# Patient Record
Sex: FEMALE | Race: WHITE | NOT HISPANIC OR LATINO | URBAN - METROPOLITAN AREA
[De-identification: names, ages, dates, MRNs, and addresses within clinical notes are randomized per-mention and may not be internally consistent; named-entity substitution may affect disease eponyms.]

---

## 2017-09-20 ENCOUNTER — OFFICE (OUTPATIENT)
Dept: URBAN - METROPOLITAN AREA CLINIC 101 | Facility: CLINIC | Age: 27
End: 2017-09-20
Payer: MEDICAID

## 2017-09-20 VITALS
DIASTOLIC BLOOD PRESSURE: 73 MMHG | TEMPERATURE: 98.6 F | HEIGHT: 59 IN | SYSTOLIC BLOOD PRESSURE: 116 MMHG | WEIGHT: 103 LBS | HEART RATE: 70 BPM

## 2017-09-20 DIAGNOSIS — R10.11 RIGHT UPPER QUADRANT PAIN: ICD-10-CM

## 2017-09-20 DIAGNOSIS — R19.5 OTHER FECAL ABNORMALITIES: ICD-10-CM

## 2017-09-20 DIAGNOSIS — D50.9 IRON DEFICIENCY ANEMIA, UNSPECIFIED: ICD-10-CM

## 2017-09-20 DIAGNOSIS — R11.0 NAUSEA: ICD-10-CM

## 2017-09-20 PROCEDURE — 99214 OFFICE O/P EST MOD 30 MIN: CPT

## 2017-11-02 ENCOUNTER — ON CAMPUS - OUTPATIENT (OUTPATIENT)
Dept: URBAN - METROPOLITAN AREA HOSPITAL 14 | Facility: HOSPITAL | Age: 27
End: 2017-11-02
Payer: MEDICAID

## 2017-11-02 DIAGNOSIS — K29.60 OTHER GASTRITIS WITHOUT BLEEDING: ICD-10-CM

## 2017-11-02 DIAGNOSIS — R10.11 RIGHT UPPER QUADRANT PAIN: ICD-10-CM

## 2017-11-02 DIAGNOSIS — R11.0 NAUSEA: ICD-10-CM

## 2017-11-02 PROCEDURE — 43239 EGD BIOPSY SINGLE/MULTIPLE: CPT

## 2017-12-08 ENCOUNTER — OFFICE (OUTPATIENT)
Dept: URBAN - METROPOLITAN AREA CLINIC 33 | Facility: CLINIC | Age: 27
End: 2017-12-08
Payer: MEDICAID

## 2017-12-08 VITALS
HEIGHT: 59 IN | WEIGHT: 99 LBS | DIASTOLIC BLOOD PRESSURE: 81 MMHG | TEMPERATURE: 97.9 F | HEART RATE: 68 BPM | SYSTOLIC BLOOD PRESSURE: 119 MMHG

## 2017-12-08 DIAGNOSIS — R10.13 EPIGASTRIC PAIN: ICD-10-CM

## 2017-12-08 DIAGNOSIS — R10.11 RIGHT UPPER QUADRANT PAIN: ICD-10-CM

## 2017-12-08 PROCEDURE — 99213 OFFICE O/P EST LOW 20 MIN: CPT

## 2017-12-08 RX ORDER — OMEPRAZOLE 20 MG/1
20 CAPSULE, DELAYED RELEASE ORAL
Qty: 30 | Refills: 5 | Status: COMPLETED
Start: 2017-12-08 | End: 2018-02-26

## 2018-02-26 ENCOUNTER — OFFICE (OUTPATIENT)
Dept: URBAN - METROPOLITAN AREA CLINIC 78 | Facility: CLINIC | Age: 28
End: 2018-02-26
Payer: MEDICAID

## 2018-02-26 VITALS
TEMPERATURE: 97.9 F | SYSTOLIC BLOOD PRESSURE: 125 MMHG | WEIGHT: 95 LBS | HEIGHT: 59 IN | DIASTOLIC BLOOD PRESSURE: 78 MMHG | HEART RATE: 69 BPM

## 2018-02-26 DIAGNOSIS — K31.84 GASTROPARESIS: ICD-10-CM

## 2018-02-26 DIAGNOSIS — R11.0 NAUSEA: ICD-10-CM

## 2018-02-26 DIAGNOSIS — R63.4 ABNORMAL WEIGHT LOSS: ICD-10-CM

## 2018-02-26 PROCEDURE — 99214 OFFICE O/P EST MOD 30 MIN: CPT

## 2018-03-27 ENCOUNTER — OFFICE (OUTPATIENT)
Dept: URBAN - METROPOLITAN AREA CLINIC 33 | Facility: CLINIC | Age: 28
End: 2018-03-27
Payer: MEDICAID

## 2018-03-27 VITALS
TEMPERATURE: 97.7 F | HEART RATE: 66 BPM | WEIGHT: 97 LBS | SYSTOLIC BLOOD PRESSURE: 103 MMHG | HEIGHT: 59 IN | DIASTOLIC BLOOD PRESSURE: 73 MMHG

## 2018-03-27 DIAGNOSIS — K31.84 GASTROPARESIS: ICD-10-CM

## 2018-03-27 DIAGNOSIS — D50.9 IRON DEFICIENCY ANEMIA, UNSPECIFIED: ICD-10-CM

## 2018-03-27 DIAGNOSIS — R63.4 ABNORMAL WEIGHT LOSS: ICD-10-CM

## 2018-03-27 PROCEDURE — 99214 OFFICE O/P EST MOD 30 MIN: CPT

## 2018-08-03 ENCOUNTER — OFFICE (OUTPATIENT)
Dept: URBAN - METROPOLITAN AREA CLINIC 101 | Facility: CLINIC | Age: 28
End: 2018-08-03
Payer: MEDICAID

## 2018-08-03 VITALS
SYSTOLIC BLOOD PRESSURE: 111 MMHG | DIASTOLIC BLOOD PRESSURE: 77 MMHG | HEART RATE: 73 BPM | WEIGHT: 89 LBS | HEIGHT: 59 IN | TEMPERATURE: 98.8 F

## 2018-08-03 DIAGNOSIS — K31.84 GASTROPARESIS: ICD-10-CM

## 2018-08-03 DIAGNOSIS — R63.4 ABNORMAL WEIGHT LOSS: ICD-10-CM

## 2018-08-03 PROCEDURE — 99213 OFFICE O/P EST LOW 20 MIN: CPT

## 2018-08-03 RX ORDER — DRONABINOL 5 MG/1
10 CAPSULE, LIQUID FILLED ORAL
Qty: 60 | Refills: 2 | Status: COMPLETED
Start: 2018-08-03 | End: 2019-02-14

## 2019-02-14 ENCOUNTER — OFFICE (OUTPATIENT)
Dept: URBAN - METROPOLITAN AREA CLINIC 101 | Facility: CLINIC | Age: 29
End: 2019-02-14
Payer: MEDICAID

## 2019-02-14 VITALS
TEMPERATURE: 98.6 F | HEIGHT: 59 IN | WEIGHT: 98 LBS | HEART RATE: 99 BPM | SYSTOLIC BLOOD PRESSURE: 94 MMHG | DIASTOLIC BLOOD PRESSURE: 75 MMHG

## 2019-02-14 DIAGNOSIS — R19.7 DIARRHEA, UNSPECIFIED: ICD-10-CM

## 2019-02-14 DIAGNOSIS — R11.2 NAUSEA WITH VOMITING, UNSPECIFIED: ICD-10-CM

## 2019-02-14 DIAGNOSIS — K31.84 GASTROPARESIS: ICD-10-CM

## 2019-02-14 PROCEDURE — 99213 OFFICE O/P EST LOW 20 MIN: CPT

## 2019-02-14 RX ORDER — HYOSCYAMINE SULFATE 0.12 MG/1
0.38 TABLET, ORALLY DISINTEGRATING ORAL
Qty: 45 | Refills: 2 | Status: COMPLETED
Start: 2019-02-14 | End: 2019-07-09

## 2019-07-09 ENCOUNTER — OFFICE (OUTPATIENT)
Dept: URBAN - METROPOLITAN AREA CLINIC 101 | Facility: CLINIC | Age: 29
End: 2019-07-09
Payer: MEDICAID

## 2019-07-09 VITALS
HEIGHT: 59 IN | HEART RATE: 83 BPM | DIASTOLIC BLOOD PRESSURE: 72 MMHG | WEIGHT: 99 LBS | SYSTOLIC BLOOD PRESSURE: 112 MMHG | TEMPERATURE: 98.2 F

## 2019-07-09 DIAGNOSIS — R10.13 EPIGASTRIC PAIN: ICD-10-CM

## 2019-07-09 DIAGNOSIS — R53.83 OTHER FATIGUE: ICD-10-CM

## 2019-07-09 PROCEDURE — 99213 OFFICE O/P EST LOW 20 MIN: CPT

## 2020-10-26 ENCOUNTER — TELEHEALTH PROVIDED OTHER THAN IN PATIENT'S HOME (OUTPATIENT)
Dept: URBAN - METROPOLITAN AREA TELEHEALTH 12 | Facility: TELEHEALTH | Age: 30
End: 2020-10-26
Payer: MEDICAID

## 2020-10-26 VITALS — WEIGHT: 98 LBS | HEIGHT: 59 IN

## 2020-10-26 DIAGNOSIS — R11.0 NAUSEA: ICD-10-CM

## 2020-10-26 DIAGNOSIS — R10.13 EPIGASTRIC PAIN: ICD-10-CM

## 2020-10-26 DIAGNOSIS — K31.84 GASTROPARESIS: ICD-10-CM

## 2020-10-26 PROCEDURE — 99213 OFFICE O/P EST LOW 20 MIN: CPT | Mod: 95 | Performed by: INTERNAL MEDICINE

## 2020-10-26 NOTE — SERVICEHPINOTES
PATIENT VERIFIED BY DATE OF BIRTH AND NAME. Patient has been consented for this telecommunication visit.   31 yo fm with continued abdm pain.  Pt states it is in her epigstric/sternal region.  Pt states sx sometimes after eating.  Rare nausea.  Pt states sometimes has pain on the left side of her back - radiates there.  Pt with continued issues.  Pt sx x3 weeks.  Pt has lost weight - has lost about 5-7 lbs.  Pt does have gastroparesis.  ROS o/w negative.

## 2021-01-28 ENCOUNTER — OFFICE (OUTPATIENT)
Dept: URBAN - METROPOLITAN AREA TELEHEALTH 12 | Facility: TELEHEALTH | Age: 31
End: 2021-01-28
Payer: MEDICAID

## 2021-01-28 VITALS — HEIGHT: 59 IN | WEIGHT: 103 LBS

## 2021-01-28 DIAGNOSIS — D50.9 IRON DEFICIENCY ANEMIA, UNSPECIFIED: ICD-10-CM

## 2021-01-28 DIAGNOSIS — R10.13 EPIGASTRIC PAIN: ICD-10-CM

## 2021-01-28 PROCEDURE — 99214 OFFICE O/P EST MOD 30 MIN: CPT

## 2021-01-28 RX ORDER — PANTOPRAZOLE SODIUM 40 MG/1
TABLET, DELAYED RELEASE ORAL
Qty: 30 | Refills: 5 | Status: ACTIVE
Start: 2021-01-28

## 2021-01-28 NOTE — SERVICEHPINOTES
PATIENT VERIFIED BY DATE OF BIRTH AND NAME. Patient has been consented for this telecommunication visit.   ALTON TOVAR   is a   30  female who complains of abd pain. Normal reports epigastric/left sided abdominal pain which radiates to shoulder. Ongoing for awhile, at least since last visit in Oct.  Pain is constant and gnawing-type pain. Normal abd US 11/2020. EGD in 2017 with moderate chronic gastritis. Labs were ordered after last visit in Oct (CBC, CMP, lipase, amylase) which she states she had completed and were normal, will bring copies by office tomorrow. She takes tums prn which do seem to provide short term relief. She is not taking PPI. She reports h/o anemia which ferritin has been decreasing recently, unsure levels. She had physical today, awaiting lab results. A little more fatigued recently. No melena.. Appetite is fine. nausea intermittently, she does have a h/o gastroparesis but has not had issues for awhile, watches what she eats. No vomiting. Gaining weight. Takes Tylenol prn, no nsaids. Nonsmoker. Drinks 1 cup of coffee/day. Has SVT. All 10 point review of systems have been reviewed as per HPI and otherwise negative.

## 2021-02-19 ENCOUNTER — OFFICE (OUTPATIENT)
Dept: URBAN - METROPOLITAN AREA CLINIC 34 | Facility: CLINIC | Age: 31
End: 2021-02-19
Payer: MEDICAID

## 2021-02-19 DIAGNOSIS — Z11.59 ENCOUNTER FOR SCREENING FOR OTHER VIRAL DISEASES: ICD-10-CM

## 2021-02-19 PROCEDURE — 99211 OFF/OP EST MAY X REQ PHY/QHP: CPT | Mod: 25,CS | Performed by: INTERNAL MEDICINE

## 2021-03-15 ENCOUNTER — ON CAMPUS - OUTPATIENT (OUTPATIENT)
Dept: URBAN - METROPOLITAN AREA HOSPITAL 16 | Facility: HOSPITAL | Age: 31
End: 2021-03-15
Payer: MEDICAID

## 2021-03-15 DIAGNOSIS — R10.13 EPIGASTRIC PAIN: ICD-10-CM

## 2021-03-15 DIAGNOSIS — K20.80 OTHER ESOPHAGITIS WITHOUT BLEEDING: ICD-10-CM

## 2021-03-15 DIAGNOSIS — K29.60 OTHER GASTRITIS WITHOUT BLEEDING: ICD-10-CM

## 2021-03-15 DIAGNOSIS — D50.9 IRON DEFICIENCY ANEMIA, UNSPECIFIED: ICD-10-CM

## 2021-03-15 PROCEDURE — 43239 EGD BIOPSY SINGLE/MULTIPLE: CPT | Performed by: INTERNAL MEDICINE

## 2021-04-28 ENCOUNTER — TELEHEALTH PROVIDED OTHER THAN IN PATIENT'S HOME (OUTPATIENT)
Dept: URBAN - METROPOLITAN AREA TELEHEALTH 7 | Facility: TELEHEALTH | Age: 31
End: 2021-04-28
Payer: MEDICAID

## 2021-04-28 VITALS — WEIGHT: 104 LBS | HEIGHT: 59 IN

## 2021-04-28 DIAGNOSIS — R11.0 NAUSEA: ICD-10-CM

## 2021-04-28 DIAGNOSIS — K31.84 GASTROPARESIS: ICD-10-CM

## 2021-04-28 DIAGNOSIS — R10.13 EPIGASTRIC PAIN: ICD-10-CM

## 2021-04-28 PROCEDURE — 99214 OFFICE O/P EST MOD 30 MIN: CPT | Mod: 95 | Performed by: PHYSICIAN ASSISTANT

## 2021-04-28 NOTE — SERVICEHPINOTES
PATIENT VERIFIED BY DATE OF BIRTH AND NAME. Patient has been consented for this telecommunication visit.   Ms. Lozada is here for f/u regarding nausea and epigastric pain. She has nausea after any PO intake. She has a lot of "pressure" in her sternum. She is forcing herself to eat. Not vomiting and has been able to maintain her weight. She underwent an EGD 03/2021 which showed mild reflux changes but was o/w unremarkable. Currently, taking Pantoprazole maybe helping with the pressure sensation in the sternum but hasn't helped with the nausea. Nausea is worse on an empty stomach. No regular NSAID use. GI emptying scan from 2018 showed gastroparesis but it was a  90 minute test.     Normal abd US 11/2020. Normal HIDA scan in 2017.  Takes Tylenol prn, no nsaids. Nonsmoker. Drinks 1 cup of coffee/day. ROS as per HPI and otherwise unremarkable. ROS as per HPI and otherwise unremarkable

## 2021-05-20 ENCOUNTER — TELEHEALTH PROVIDED OTHER THAN IN PATIENT'S HOME (OUTPATIENT)
Dept: URBAN - METROPOLITAN AREA TELEHEALTH 7 | Facility: TELEHEALTH | Age: 31
End: 2021-05-20
Payer: MEDICAID

## 2021-05-20 VITALS — HEIGHT: 59 IN | WEIGHT: 104 LBS

## 2021-05-20 DIAGNOSIS — R19.5 OTHER FECAL ABNORMALITIES: ICD-10-CM

## 2021-05-20 DIAGNOSIS — K31.84 GASTROPARESIS: ICD-10-CM

## 2021-05-20 DIAGNOSIS — R10.13 EPIGASTRIC PAIN: ICD-10-CM

## 2021-05-20 PROCEDURE — 99214 OFFICE O/P EST MOD 30 MIN: CPT | Mod: 95 | Performed by: INTERNAL MEDICINE

## 2021-05-20 RX ORDER — FAMOTIDINE 40 MG/1
40 TABLET, FILM COATED ORAL
Qty: 30 | Refills: 5 | Status: ACTIVE
Start: 2021-05-20

## 2021-05-20 NOTE — SERVICEHPINOTES
PATIENT VERIFIED BY DATE OF BIRTH AND NAME. Patient has been consented for this telecommunication visit.   29 yo fm with abdm pain and increased in her Epigastric region at last visit.  Loose BMs. Pt has DM and is being worked up for DM/Hypoglycemia.  Pt has not gotten the GES repeat yet.  Pt taking PPI in am before in the am.  Pt states she is trying to take more frequent meals.  Taking PPI in the am.  Pt is getting nausea that is worse.  Pt stool studies pending.  Pt states with PPI in am sx are better with pain.  Pt has emesis rarely.  Pt states does not like to take antinausea meds for the most part.  Pt here for fu.  ROS o/w negative.

## 2021-12-15 ENCOUNTER — TELEHEALTH PROVIDED OTHER THAN IN PATIENT'S HOME (OUTPATIENT)
Dept: URBAN - METROPOLITAN AREA TELEHEALTH 12 | Facility: TELEHEALTH | Age: 31
End: 2021-12-15
Payer: MEDICAID

## 2021-12-15 VITALS — WEIGHT: 107 LBS | HEIGHT: 59 IN

## 2021-12-15 DIAGNOSIS — R10.13 EPIGASTRIC PAIN: ICD-10-CM

## 2021-12-15 DIAGNOSIS — R63.4 ABNORMAL WEIGHT LOSS: ICD-10-CM

## 2021-12-15 DIAGNOSIS — K31.84 GASTROPARESIS: ICD-10-CM

## 2021-12-15 DIAGNOSIS — R19.5 OTHER FECAL ABNORMALITIES: ICD-10-CM

## 2021-12-15 DIAGNOSIS — K82.4 CHOLESTEROLOSIS OF GALLBLADDER: ICD-10-CM

## 2021-12-15 PROCEDURE — 99214 OFFICE O/P EST MOD 30 MIN: CPT | Mod: 95 | Performed by: INTERNAL MEDICINE

## 2021-12-15 RX ORDER — SUCRALFATE ORAL 1 G/10ML
4000 SUSPENSION ORAL
Qty: 600 | Refills: 0 | Status: ACTIVE
Start: 2021-12-15

## 2021-12-15 NOTE — SERVICEHPINOTES
PATIENT VERIFIED BY DATE OF BIRTH AND NAME. Patient has been consented for this telecommunication visit.   30 yo fm with issues with n/v - worse with recent AGE illness.  Pt here for fu.  Pt has having CT for low BS and possible NET.  Pt told that hypoglycemia worse with the gastroparesis.  Pt has polyps in US.  Pt here for fu.  Pt weight is up - feels like she is ?eating better.  Pt does not take anti-nausea meds.  Pt has some dyspepsia too.  Pt thinks either viral illness vs possible food.poisoning.  Pt is trying to avoid certain foods.  Rare ruq pain.  ROS o/w negative.